# Patient Record
Sex: FEMALE | Race: WHITE | NOT HISPANIC OR LATINO | Employment: UNEMPLOYED | URBAN - METROPOLITAN AREA
[De-identification: names, ages, dates, MRNs, and addresses within clinical notes are randomized per-mention and may not be internally consistent; named-entity substitution may affect disease eponyms.]

---

## 2019-03-04 ENCOUNTER — HOSPITAL ENCOUNTER (EMERGENCY)
Facility: HOSPITAL | Age: 54
Discharge: HOME | End: 2019-03-04
Attending: EMERGENCY MEDICINE | Admitting: EMERGENCY MEDICINE
Payer: MEDICAID

## 2019-03-04 ENCOUNTER — APPOINTMENT (EMERGENCY)
Dept: RADIOLOGY | Facility: HOSPITAL | Age: 54
End: 2019-03-04
Payer: MEDICAID

## 2019-03-04 VITALS
SYSTOLIC BLOOD PRESSURE: 157 MMHG | DIASTOLIC BLOOD PRESSURE: 83 MMHG | RESPIRATION RATE: 18 BRPM | TEMPERATURE: 97.3 F | OXYGEN SATURATION: 99 % | HEART RATE: 79 BPM
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
## 2019-03-04 DIAGNOSIS — S20.219A RIB CONTUSION: ICD-10-CM

## 2019-03-04 PROCEDURE — 73060 X-RAY EXAM OF HUMERUS: CPT

## 2019-03-04 PROCEDURE — 99283 EMERGENCY DEPT VISIT LOW MDM: CPT

## 2019-03-04 PROCEDURE — 73080 X-RAY EXAM OF ELBOW: CPT

## 2019-03-04 PROCEDURE — 71101 X-RAY EXAM UNILAT RIBS/CHEST: CPT

## 2019-03-04 PROCEDURE — 96372 THER/PROPH/DIAG INJ SC/IM: CPT

## 2019-03-04 RX ORDER — OXYCODONE HYDROCHLORIDE AND ACETAMINOPHEN 5; 325 MG/1; MG/1
1 TABLET ORAL ONCE
Status: COMPLETED | OUTPATIENT
Start: 2019-03-04 | End: 2019-03-04

## 2019-03-04 RX ORDER — LIDOCAINE 50 MG/G
1 PATCH TOPICAL DAILY
Qty: 1 PATCH | Refills: 0 | Status: SHIPPED | OUTPATIENT
Start: 2019-03-04

## 2019-03-04 RX ORDER — OXYCODONE HYDROCHLORIDE AND ACETAMINOPHEN 5; 325 MG/1; MG/1
1 TABLET ORAL EVERY 8 HOURS PRN
Qty: 10 TABLET | Refills: 0 | Status: SHIPPED | OUTPATIENT
Start: 2019-03-04 | End: 2019-03-07

## 2019-03-04 RX ORDER — KETOROLAC TROMETHAMINE 30 MG/ML
30 INJECTION, SOLUTION INTRAMUSCULAR; INTRAVENOUS ONCE
Status: COMPLETED | OUTPATIENT
Start: 2019-03-04 | End: 2019-03-04

## 2019-03-04 RX ORDER — NAPROXEN 500 MG/1
500 TABLET ORAL 2 TIMES DAILY WITH MEALS
Qty: 10 TABLET | Refills: 0 | Status: SHIPPED | OUTPATIENT
Start: 2019-03-04 | End: 2019-03-09

## 2019-03-04 RX ADMIN — KETOROLAC TROMETHAMINE 30 MG: 30 INJECTION, SOLUTION INTRAMUSCULAR at 19:14

## 2019-03-04 RX ADMIN — OXYCODONE AND ACETAMINOPHEN 1 TABLET: 5; 325 TABLET ORAL at 20:32

## 2019-03-05 NOTE — ED PROVIDER NOTES
History  Chief Complaint   Patient presents with    Fall     fell on ice  yesterday,c/o R arm/elbow pain and R rib pain now coming , hurts to breathe, did not hit head, not on thinners     47 y/o female presents with right sided rib pain, chest wall pain, elbow pain and upper arm pain after a fall yesterday  She did not hit her head, not on anticoagulation  No neck pain, abdominal pain  the pain is sharp continuous, currently 8/10 nothing makes it better or worse  History provided by:  Patient   used: No        None       History reviewed  No pertinent past medical history  Past Surgical History:   Procedure Laterality Date    HYSTERECTOMY         History reviewed  No pertinent family history  I have reviewed and agree with the history as documented  Social History     Tobacco Use    Smoking status: Current Every Day Smoker    Smokeless tobacco: Never Used    Tobacco comment: 5 cigarettes   Substance Use Topics    Alcohol use: Not Currently    Drug use: Not Currently        Review of Systems   All other systems reviewed and are negative  Physical Exam  Physical Exam   Constitutional: She is oriented to person, place, and time  She appears well-developed and well-nourished  HENT:   Head: Normocephalic and atraumatic  Eyes: Pupils are equal, round, and reactive to light  EOM are normal    Neck: Normal range of motion  Neck supple  Cardiovascular: Normal rate and regular rhythm  Pulmonary/Chest: Effort normal and breath sounds normal    Abdominal: Soft  Bowel sounds are normal    Musculoskeletal: Normal range of motion  Right chest wall tenderness, right sided rib tenderness, no abdominal tenderness   Neurological: She is alert and oriented to person, place, and time  Skin: Skin is warm and dry  Psychiatric: She has a normal mood and affect  Nursing note and vitals reviewed        Vital Signs  ED Triage Vitals [03/04/19 1842]   Temperature Pulse Respirations Blood Pressure SpO2   (!) 97 3 °F (36 3 °C) 78 20 (!) 174/101 98 %      Temp src Heart Rate Source Patient Position - Orthostatic VS BP Location FiO2 (%)   -- Monitor Sitting Left arm --      Pain Score       8           Vitals:    03/04/19 1842 03/04/19 1910   BP: (!) 174/101 157/83   Pulse: 78 79   Patient Position - Orthostatic VS: Sitting Lying       Visual Acuity      ED Medications  Medications   oxyCODONE-acetaminophen (PERCOCET) 5-325 mg per tablet 1 tablet (has no administration in time range)   ketorolac (TORADOL) injection 30 mg (30 mg Intramuscular Given 3/4/19 1914)       Diagnostic Studies  Results Reviewed     None                 XR ribs with pa chest min 3 views RIGHT   Final Result by Kimmy Quintero MD (03/04 2003)      No active cardiopulmonary disease  No evidence of rib fractures  Workstation performed: [id number]         XR humerus RIGHT   Final Result by Kimmy Quintero MD (03/04 2000)      No acute osseous abnormality  Workstation performed: [id number]         XR elbow 3+ vw RIGHT   Final Result by Kimmy Quintero MD (03/04 1959)      No acute osseous abnormality  Workstation performed: [id number]                    Procedures  Procedures       Phone Contacts  ED Phone Contact    ED Course                               MDM  Number of Diagnoses or Management Options  Fall, initial encounter:   Rib contusion:   Diagnosis management comments: Patient evaluated with imaging  I reviewed the results and discussed them with the patient  Patient discharged with appropriate instructions medications and follow-up  Patient verbalized understanding had no further questions at the time of discharge  Patient had stable vital signs and well-appearing at the time of discharge         Amount and/or Complexity of Data Reviewed  Tests in the radiology section of CPT®: reviewed and ordered  Tests in the medicine section of CPT®: ordered and reviewed    Patient Progress  Patient progress: stable      Disposition  Final diagnoses:   Fall, initial encounter   Rib contusion     Time reflects when diagnosis was documented in both MDM as applicable and the Disposition within this note     Time User Action Codes Description Comment    3/4/2019  8:08 PM AnepuIsabelle Add [A16  PODU] Fall, initial encounter     3/4/2019  8:08 PM MarlapuGriseldaSeamus Quince Add [S20 219A] Rib contusion       ED Disposition     ED Disposition Condition Date/Time Comment    Discharge Stable Mon Mar 4, 2019  8:08 PM Mikayla Syedjoseph discharge to home/self care  Follow-up Information     Follow up With Specialties Details Why Contact Info Additional Information    Vivian Israel MD Family Medicine   [facility]  [phone]       [facility] Emergency Department Emergency Medicine   [address] [address]  [phone] [place], [place], [place], [address]          Patient's Medications   Discharge Prescriptions    LIDOCAINE (LIDODERM) 5 %    Apply 1 patch topically daily Remove & Discard patch within 12 hours or as directed by MD       Start Date: 3/4/2019  End Date: --       Order Dose: 1 patch       Quantity: 1 patch    Refills: 0    NAPROXEN (NAPROSYN) 500 MG TABLET    Take 1 tablet (500 mg total) by mouth 2 (two) times a day with meals for 5 days       Start Date: 3/4/2019  End Date: 3/9/2019       Order Dose: 500 mg       Quantity: 10 tablet    Refills: 0    OXYCODONE-ACETAMINOPHEN (PERCOCET) 5-325 MG PER TABLET    Take 1 tablet by mouth every 8 (eight) hours as needed for moderate pain for up to 3 daysMax Daily Amount: 3 tablets       Start Date: 3/4/2019  End Date: 3/7/2019       Order Dose: 1 tablet       Quantity: 10 tablet    Refills: 0     No discharge procedures on file      ED Provider  Electronically Signed by           Layton Armstrong DO  03/04/19 2028

## 2024-06-22 ENCOUNTER — HOSPITAL ENCOUNTER (EMERGENCY)
Facility: HOSPITAL | Age: 59
Discharge: HOME | End: 2024-06-22
Attending: EMERGENCY MEDICINE
Payer: MEDICAID

## 2024-06-22 VITALS
DIASTOLIC BLOOD PRESSURE: 79 MMHG | TEMPERATURE: 98.4 F | WEIGHT: 212 LBS | HEART RATE: 92 BPM | OXYGEN SATURATION: 96 % | RESPIRATION RATE: 22 BRPM | SYSTOLIC BLOOD PRESSURE: 122 MMHG

## 2024-06-22 DIAGNOSIS — T14.8XXA PUNCTURE WOUND: Primary | ICD-10-CM

## 2024-06-22 PROCEDURE — 99284 EMERGENCY DEPT VISIT MOD MDM: CPT | Performed by: EMERGENCY MEDICINE

## 2024-06-22 PROCEDURE — 99282 EMERGENCY DEPT VISIT SF MDM: CPT

## 2024-06-22 PROCEDURE — 90471 IMMUNIZATION ADMIN: CPT

## 2024-06-22 PROCEDURE — 90715 TDAP VACCINE 7 YRS/> IM: CPT | Performed by: EMERGENCY MEDICINE

## 2024-06-22 RX ORDER — ATORVASTATIN CALCIUM 20 MG/1
20 TABLET, FILM COATED ORAL DAILY
COMMUNITY

## 2024-06-22 RX ORDER — CIPROFLOXACIN 500 MG/1
500 TABLET, FILM COATED ORAL 2 TIMES DAILY
Qty: 14 TABLET | Refills: 0 | Status: SHIPPED | OUTPATIENT
Start: 2024-06-22 | End: 2024-06-29

## 2024-06-22 RX ORDER — CIPROFLOXACIN 500 MG/1
500 TABLET, FILM COATED ORAL ONCE
Status: COMPLETED | OUTPATIENT
Start: 2024-06-22 | End: 2024-06-22

## 2024-06-22 RX ORDER — LISINOPRIL 20 MG/1
20 TABLET ORAL DAILY
COMMUNITY

## 2024-06-22 RX ADMIN — CIPROFLOXACIN HYDROCHLORIDE 500 MG: 500 TABLET, FILM COATED ORAL at 20:06

## 2024-06-22 RX ADMIN — TETANUS TOXOID, REDUCED DIPHTHERIA TOXOID AND ACELLULAR PERTUSSIS VACCINE, ADSORBED 0.5 ML: 5; 2.5; 8; 8; 2.5 SUSPENSION INTRAMUSCULAR at 20:07

## 2024-06-22 NOTE — Clinical Note
Nathalie Manzano was seen and treated in our emergency department on 6/22/2024.                Diagnosis:     Nathalie  may return to work on return date.    She may return on this date: 06/24/2024         If you have any questions or concerns, please don't hesitate to call.      Porter Giron, DO    ______________________________           _______________          _______________  Hospital Representative                              Date                                Time

## 2024-06-22 NOTE — ED PROVIDER NOTES
History  Chief Complaint   Patient presents with    Puncture Wound     Stepped on celina upholstery nail     58-year-old female stepped on celina nail states all of it was intact she pulled all of it back out.  Does have puncture wound on the left foot and calcaneus.  No other complaints bleeding controlled.  Tetanus is not up-to-date.        Prior to Admission Medications   Prescriptions Last Dose Informant Patient Reported? Taking?   atorvastatin (LIPITOR) 20 mg tablet   Yes Yes   Sig: Take 20 mg by mouth daily   lidocaine (LIDODERM) 5 % Not Taking  No No   Sig: Apply 1 patch topically daily Remove & Discard patch within 12 hours or as directed by MD   Patient not taking: Reported on 6/22/2024   lisinopril (ZESTRIL) 20 mg tablet   Yes Yes   Sig: Take 20 mg by mouth daily   naproxen (NAPROSYN) 500 mg tablet   No No   Sig: Take 1 tablet (500 mg total) by mouth 2 (two) times a day with meals for 5 days      Facility-Administered Medications: None       Past Medical History:   Diagnosis Date    Hypercholesteremia     Hypertension        Past Surgical History:   Procedure Laterality Date    HYSTERECTOMY         History reviewed. No pertinent family history.  I have reviewed and agree with the history as documented.    E-Cigarette/Vaping    E-Cigarette Use Never User      E-Cigarette/Vaping Substances     Social History     Tobacco Use    Smoking status: Every Day     Current packs/day: 0.25     Types: Cigarettes    Smokeless tobacco: Never    Tobacco comments:     5 cigarettes   Vaping Use    Vaping status: Never Used   Substance Use Topics    Alcohol use: Not Currently    Drug use: Not Currently       Review of Systems   Constitutional:  Negative for activity change, chills, diaphoresis and fever.   HENT:  Negative for congestion, ear pain, nosebleeds, sore throat, trouble swallowing and voice change.    Eyes:  Negative for pain, discharge and redness.   Respiratory:  Negative for apnea, cough, choking, shortness of  breath, wheezing and stridor.    Cardiovascular:  Negative for chest pain and palpitations.   Gastrointestinal:  Negative for abdominal distention, abdominal pain, constipation, diarrhea, nausea and vomiting.   Endocrine: Negative for polydipsia.   Genitourinary:  Negative for difficulty urinating, dysuria, flank pain, frequency, hematuria and urgency.   Musculoskeletal:  Negative for back pain, gait problem, joint swelling, myalgias, neck pain and neck stiffness.   Skin:  Positive for wound. Negative for pallor and rash.   Neurological:  Negative for dizziness, tremors, syncope, speech difficulty, weakness, numbness and headaches.   Hematological:  Negative for adenopathy.   Psychiatric/Behavioral:  Negative for confusion, hallucinations, self-injury and suicidal ideas. The patient is not nervous/anxious.        Physical Exam  Physical Exam  Vitals and nursing note reviewed.   Constitutional:       General: She is not in acute distress.     Appearance: She is well-developed. She is not diaphoretic.   HENT:      Head: Normocephalic and atraumatic.      Right Ear: External ear normal.      Left Ear: External ear normal.      Nose: Nose normal.   Eyes:      Conjunctiva/sclera: Conjunctivae normal.      Pupils: Pupils are equal, round, and reactive to light.   Cardiovascular:      Rate and Rhythm: Normal rate and regular rhythm.      Heart sounds: Normal heart sounds.   Pulmonary:      Effort: Pulmonary effort is normal.      Breath sounds: Normal breath sounds.   Abdominal:      General: Bowel sounds are normal.      Palpations: Abdomen is soft.      Tenderness: There is no abdominal tenderness.   Musculoskeletal:         General: Normal range of motion.      Cervical back: Normal range of motion and neck supple.   Skin:     General: Skin is warm and dry.      Findings: Lesion present.   Neurological:      Mental Status: She is alert and oriented to person, place, and time.      Deep Tendon Reflexes: Reflexes are  normal and symmetric.   Psychiatric:         Behavior: Behavior is cooperative.         Vital Signs  ED Triage Vitals [06/22/24 1930]   Temperature Pulse Respirations Blood Pressure SpO2   98.4 °F (36.9 °C) 92 22 122/79 96 %      Temp Source Heart Rate Source Patient Position - Orthostatic VS BP Location FiO2 (%)   Tympanic Monitor Sitting Right arm --      Pain Score       2           Vitals:    06/22/24 1930   BP: 122/79   Pulse: 92   Patient Position - Orthostatic VS: Sitting         Visual Acuity      ED Medications  Medications   tetanus-diphtheria-acellular pertussis (BOOSTRIX) IM injection 0.5 mL (has no administration in time range)   ciprofloxacin (CIPRO) tablet 500 mg (has no administration in time range)       Diagnostic Studies  Results Reviewed       None                   No orders to display              Procedures  Procedures         ED Course                                             Medical Decision Making  Risk  Prescription drug management.             Disposition  Final diagnoses:   Puncture wound     Time reflects when diagnosis was documented in both MDM as applicable and the Disposition within this note       Time User Action Codes Description Comment    6/22/2024  7:38 PM Porter Giron Add [T14.8XXA] Puncture wound           ED Disposition       ED Disposition   Discharge    Condition   Stable    Date/Time   Sat Jun 22, 2024  7:38 PM    Comment   Nathalie Manzano discharge to home/self care.                   Follow-up Information       Follow up With Specialties Details Why Contact Info    Linda Hadley MD Family Medicine Schedule an appointment as soon as possible for a visit  As needed [address]  [facility]  [phone]              Patient's Medications   Discharge Prescriptions    CIPROFLOXACIN (CIPRO) 500 MG TABLET    Take 1 tablet (500 mg total) by mouth 2 (two) times a day for 7 days       Start Date: 6/22/2024 End Date: 6/29/2024       Order Dose: 500 mg       Quantity:  14 tablet    Refills: 0       No discharge procedures on file.    PDMP Review       None            ED Provider  Electronically Signed by             Porter Giron DO  06/22/24 1855